# Patient Record
Sex: FEMALE | Race: WHITE | NOT HISPANIC OR LATINO | Employment: UNEMPLOYED | URBAN - METROPOLITAN AREA
[De-identification: names, ages, dates, MRNs, and addresses within clinical notes are randomized per-mention and may not be internally consistent; named-entity substitution may affect disease eponyms.]

---

## 2024-08-17 ENCOUNTER — APPOINTMENT (OUTPATIENT)
Dept: RADIOLOGY | Facility: HOSPITAL | Age: 29
End: 2024-08-17
Payer: COMMERCIAL

## 2024-08-17 ENCOUNTER — HOSPITAL ENCOUNTER (OUTPATIENT)
Dept: CARDIOLOGY | Facility: HOSPITAL | Age: 29
Discharge: HOME | End: 2024-08-17
Payer: COMMERCIAL

## 2024-08-17 ENCOUNTER — HOSPITAL ENCOUNTER (OUTPATIENT)
Facility: HOSPITAL | Age: 29
Setting detail: OBSERVATION
Discharge: HOME | End: 2024-08-18
Attending: EMERGENCY MEDICINE | Admitting: STUDENT IN AN ORGANIZED HEALTH CARE EDUCATION/TRAINING PROGRAM
Payer: COMMERCIAL

## 2024-08-17 DIAGNOSIS — R10.13 EPIGASTRIC PAIN: Primary | ICD-10-CM

## 2024-08-17 DIAGNOSIS — R11.2 INTRACTABLE NAUSEA AND VOMITING: ICD-10-CM

## 2024-08-17 LAB
ALBUMIN SERPL BCP-MCNC: 4.7 G/DL (ref 3.4–5)
ALP SERPL-CCNC: 47 U/L (ref 33–110)
ALT SERPL W P-5'-P-CCNC: 10 U/L (ref 7–45)
ANION GAP SERPL CALC-SCNC: 16 MMOL/L (ref 10–20)
APPEARANCE UR: CLEAR
AST SERPL W P-5'-P-CCNC: 11 U/L (ref 9–39)
B-HCG SERPL-ACNC: <2 MIU/ML
BASOPHILS # BLD AUTO: 0.02 X10*3/UL (ref 0–0.1)
BASOPHILS NFR BLD AUTO: 0.3 %
BILIRUB SERPL-MCNC: 0.5 MG/DL (ref 0–1.2)
BILIRUB UR STRIP.AUTO-MCNC: NEGATIVE MG/DL
BUN SERPL-MCNC: 11 MG/DL (ref 6–23)
CALCIUM SERPL-MCNC: 9.6 MG/DL (ref 8.6–10.3)
CHLORIDE SERPL-SCNC: 101 MMOL/L (ref 98–107)
CO2 SERPL-SCNC: 22 MMOL/L (ref 21–32)
COLOR UR: ABNORMAL
CREAT SERPL-MCNC: 0.75 MG/DL (ref 0.5–1.05)
EGFRCR SERPLBLD CKD-EPI 2021: >90 ML/MIN/1.73M*2
EOSINOPHIL # BLD AUTO: 0.08 X10*3/UL (ref 0–0.7)
EOSINOPHIL NFR BLD AUTO: 1.1 %
ERYTHROCYTE [DISTWIDTH] IN BLOOD BY AUTOMATED COUNT: 12.8 % (ref 11.5–14.5)
GLUCOSE SERPL-MCNC: 107 MG/DL (ref 74–99)
GLUCOSE UR STRIP.AUTO-MCNC: NORMAL MG/DL
HCT VFR BLD AUTO: 37.3 % (ref 36–46)
HGB BLD-MCNC: 12.7 G/DL (ref 12–16)
HOLD SPECIMEN: NORMAL
HOLD SPECIMEN: NORMAL
IMM GRANULOCYTES # BLD AUTO: 0.01 X10*3/UL (ref 0–0.7)
IMM GRANULOCYTES NFR BLD AUTO: 0.1 % (ref 0–0.9)
KETONES UR STRIP.AUTO-MCNC: ABNORMAL MG/DL
LACTATE SERPL-SCNC: 1.1 MMOL/L (ref 0.4–2)
LEUKOCYTE ESTERASE UR QL STRIP.AUTO: NEGATIVE
LIPASE SERPL-CCNC: 27 U/L (ref 9–82)
LYMPHOCYTES # BLD AUTO: 2.49 X10*3/UL (ref 1.2–4.8)
LYMPHOCYTES NFR BLD AUTO: 33.1 %
MCH RBC QN AUTO: 28.4 PG (ref 26–34)
MCHC RBC AUTO-ENTMCNC: 34 G/DL (ref 32–36)
MCV RBC AUTO: 83 FL (ref 80–100)
MONOCYTES # BLD AUTO: 0.37 X10*3/UL (ref 0.1–1)
MONOCYTES NFR BLD AUTO: 4.9 %
NEUTROPHILS # BLD AUTO: 4.56 X10*3/UL (ref 1.2–7.7)
NEUTROPHILS NFR BLD AUTO: 60.5 %
NITRITE UR QL STRIP.AUTO: NEGATIVE
NRBC BLD-RTO: 0 /100 WBCS (ref 0–0)
PH UR STRIP.AUTO: 7.5 [PH]
PLATELET # BLD AUTO: 265 X10*3/UL (ref 150–450)
POTASSIUM SERPL-SCNC: 3.5 MMOL/L (ref 3.5–5.3)
PROT SERPL-MCNC: 7.9 G/DL (ref 6.4–8.2)
PROT UR STRIP.AUTO-MCNC: NEGATIVE MG/DL
RBC # BLD AUTO: 4.47 X10*6/UL (ref 4–5.2)
RBC # UR STRIP.AUTO: NEGATIVE /UL
SODIUM SERPL-SCNC: 135 MMOL/L (ref 136–145)
SP GR UR STRIP.AUTO: 1.02
UROBILINOGEN UR STRIP.AUTO-MCNC: NORMAL MG/DL
WBC # BLD AUTO: 7.5 X10*3/UL (ref 4.4–11.3)

## 2024-08-17 PROCEDURE — 2500000005 HC RX 250 GENERAL PHARMACY W/O HCPCS

## 2024-08-17 PROCEDURE — 96361 HYDRATE IV INFUSION ADD-ON: CPT

## 2024-08-17 PROCEDURE — 81003 URINALYSIS AUTO W/O SCOPE: CPT | Performed by: EMERGENCY MEDICINE

## 2024-08-17 PROCEDURE — 96374 THER/PROPH/DIAG INJ IV PUSH: CPT | Mod: 59

## 2024-08-17 PROCEDURE — 80307 DRUG TEST PRSMV CHEM ANLYZR: CPT

## 2024-08-17 PROCEDURE — 2550000001 HC RX 255 CONTRASTS: Performed by: EMERGENCY MEDICINE

## 2024-08-17 PROCEDURE — 83690 ASSAY OF LIPASE: CPT

## 2024-08-17 PROCEDURE — 74177 CT ABD & PELVIS W/CONTRAST: CPT

## 2024-08-17 PROCEDURE — 36415 COLL VENOUS BLD VENIPUNCTURE: CPT | Performed by: EMERGENCY MEDICINE

## 2024-08-17 PROCEDURE — 99285 EMERGENCY DEPT VISIT HI MDM: CPT

## 2024-08-17 PROCEDURE — 96376 TX/PRO/DX INJ SAME DRUG ADON: CPT

## 2024-08-17 PROCEDURE — 93005 ELECTROCARDIOGRAM TRACING: CPT

## 2024-08-17 PROCEDURE — 96375 TX/PRO/DX INJ NEW DRUG ADDON: CPT

## 2024-08-17 PROCEDURE — 74177 CT ABD & PELVIS W/CONTRAST: CPT | Performed by: RADIOLOGY

## 2024-08-17 PROCEDURE — 2500000004 HC RX 250 GENERAL PHARMACY W/ HCPCS (ALT 636 FOR OP/ED)

## 2024-08-17 PROCEDURE — 83605 ASSAY OF LACTIC ACID: CPT

## 2024-08-17 PROCEDURE — 2500000004 HC RX 250 GENERAL PHARMACY W/ HCPCS (ALT 636 FOR OP/ED): Performed by: EMERGENCY MEDICINE

## 2024-08-17 PROCEDURE — 99285 EMERGENCY DEPT VISIT HI MDM: CPT | Performed by: EMERGENCY MEDICINE

## 2024-08-17 PROCEDURE — 85025 COMPLETE CBC W/AUTO DIFF WBC: CPT | Performed by: EMERGENCY MEDICINE

## 2024-08-17 PROCEDURE — 84702 CHORIONIC GONADOTROPIN TEST: CPT | Performed by: EMERGENCY MEDICINE

## 2024-08-17 PROCEDURE — 36415 COLL VENOUS BLD VENIPUNCTURE: CPT

## 2024-08-17 PROCEDURE — 80053 COMPREHEN METABOLIC PANEL: CPT | Performed by: EMERGENCY MEDICINE

## 2024-08-17 RX ORDER — KETOROLAC TROMETHAMINE 15 MG/ML
15 INJECTION, SOLUTION INTRAMUSCULAR; INTRAVENOUS ONCE
Status: COMPLETED | OUTPATIENT
Start: 2024-08-17 | End: 2024-08-17

## 2024-08-17 RX ORDER — FAMOTIDINE 10 MG/ML
20 INJECTION INTRAVENOUS EVERY 12 HOURS SCHEDULED
Status: DISCONTINUED | OUTPATIENT
Start: 2024-08-17 | End: 2024-08-18 | Stop reason: HOSPADM

## 2024-08-17 RX ORDER — MORPHINE SULFATE 4 MG/ML
4 INJECTION, SOLUTION INTRAMUSCULAR; INTRAVENOUS ONCE
Status: COMPLETED | OUTPATIENT
Start: 2024-08-17 | End: 2024-08-17

## 2024-08-17 RX ORDER — ONDANSETRON 4 MG/1
4 TABLET, FILM COATED ORAL EVERY 8 HOURS PRN
Status: DISCONTINUED | OUTPATIENT
Start: 2024-08-17 | End: 2024-08-17

## 2024-08-17 RX ORDER — ONDANSETRON 4 MG/1
4 TABLET, ORALLY DISINTEGRATING ORAL ONCE
Status: COMPLETED | OUTPATIENT
Start: 2024-08-17 | End: 2024-08-17

## 2024-08-17 RX ORDER — ONDANSETRON HYDROCHLORIDE 2 MG/ML
4 INJECTION, SOLUTION INTRAVENOUS EVERY 6 HOURS PRN
Status: DISCONTINUED | OUTPATIENT
Start: 2024-08-17 | End: 2024-08-18 | Stop reason: HOSPADM

## 2024-08-17 RX ORDER — ONDANSETRON HYDROCHLORIDE 2 MG/ML
4 INJECTION, SOLUTION INTRAVENOUS EVERY 8 HOURS PRN
Status: DISCONTINUED | OUTPATIENT
Start: 2024-08-17 | End: 2024-08-17

## 2024-08-17 RX ORDER — ONDANSETRON 4 MG/1
4 TABLET, FILM COATED ORAL EVERY 6 HOURS PRN
Status: DISCONTINUED | OUTPATIENT
Start: 2024-08-17 | End: 2024-08-18 | Stop reason: HOSPADM

## 2024-08-17 RX ADMIN — KETOROLAC TROMETHAMINE 15 MG: 15 INJECTION, SOLUTION INTRAMUSCULAR; INTRAVENOUS at 22:27

## 2024-08-17 RX ADMIN — IOHEXOL 75 ML: 350 INJECTION, SOLUTION INTRAVENOUS at 21:25

## 2024-08-17 RX ADMIN — MORPHINE SULFATE 4 MG: 4 INJECTION, SOLUTION INTRAMUSCULAR; INTRAVENOUS at 19:35

## 2024-08-17 RX ADMIN — SODIUM CHLORIDE, POTASSIUM CHLORIDE, SODIUM LACTATE AND CALCIUM CHLORIDE 1000 ML: 600; 310; 30; 20 INJECTION, SOLUTION INTRAVENOUS at 19:35

## 2024-08-17 RX ADMIN — ONDANSETRON 4 MG: 4 TABLET, ORALLY DISINTEGRATING ORAL at 19:35

## 2024-08-17 RX ADMIN — MORPHINE SULFATE 4 MG: 4 INJECTION, SOLUTION INTRAMUSCULAR; INTRAVENOUS at 23:32

## 2024-08-17 ASSESSMENT — COLUMBIA-SUICIDE SEVERITY RATING SCALE - C-SSRS
6. HAVE YOU EVER DONE ANYTHING, STARTED TO DO ANYTHING, OR PREPARED TO DO ANYTHING TO END YOUR LIFE?: NO
2. HAVE YOU ACTUALLY HAD ANY THOUGHTS OF KILLING YOURSELF?: NO
1. IN THE PAST MONTH, HAVE YOU WISHED YOU WERE DEAD OR WISHED YOU COULD GO TO SLEEP AND NOT WAKE UP?: NO

## 2024-08-17 ASSESSMENT — PAIN DESCRIPTION - LOCATION
LOCATION: ABDOMEN
LOCATION: ABDOMEN

## 2024-08-17 ASSESSMENT — PAIN SCALES - GENERAL
PAINLEVEL_OUTOF10: 2
PAINLEVEL_OUTOF10: 9
PAINLEVEL_OUTOF10: 8

## 2024-08-17 ASSESSMENT — PAIN - FUNCTIONAL ASSESSMENT
PAIN_FUNCTIONAL_ASSESSMENT: 0-10
PAIN_FUNCTIONAL_ASSESSMENT: 0-10

## 2024-08-17 ASSESSMENT — PAIN DESCRIPTION - PAIN TYPE: TYPE: ACUTE PAIN

## 2024-08-17 NOTE — ED PROVIDER NOTES
EMERGENCY DEPARTMENT ENCOUNTER      Pt Name: Bharti Echeverria  MRN: [id number]  Birthdate 1995  Date of evaluation: 8/17/2024    HISTORY OF PRESENT ILLNESS    Bharti Echeverria is an 29 y.o. female with history including pancreatitis and tubal ligation, presenting to the emergency department for epigastric abdominal pain that shoots to the back.  Patient endorses this began 2 days ago and has been acutely worsening.  She has had significant nausea and nonbloody vomiting where she is unable to keep anything down at this point.  This began with diarrhea but now she has not had a bowel movement in over a day however she has not been eating either.  Went to [facility] last night and received a workup including a basic labs, lipase and an ultrasound looking for stones and was subsequently discharged upon the unremarkable investigation.  She denies chest pain, shortness of breath however does feel like she is having fever and chills.  Patient curled up in bed in the dark stating she has been this sensitive to light as well as in severe pain at the moment.      PAST MEDICAL HISTORY   No past medical history on file.    SURGICAL HISTORY     No past surgical history on file.    CURRENT MEDICATIONS       There are no discharge medications for this patient.      ALLERGIES     Patient has no known allergies.    FAMILY HISTORY     No family history on file.     SOCIAL HISTORY       Social History     Socioeconomic History    Marital status:      Social Determinants of Health     Financial Resource Strain: Low Risk  (5/20/2024)    Received from [facility]    Overall Financial Resource Strain (CARDIA)     Difficulty of Paying Living Expenses: Not very hard   Food Insecurity: No Food Insecurity (5/20/2024)    Received from [facility]    Hunger Vital Sign     Worried About Running Out of Food in the Last Year: Never true     Ran Out of Food in the Last Year: Never true   Transportation Needs: No  Transportation Needs (5/20/2024)    Received from [facility]    PRAPARE - Transportation     Lack of Transportation (Medical): No     Lack of Transportation (Non-Medical): No   Physical Activity: Insufficiently Active (5/20/2024)    Received from [facility]    Exercise Vital Sign     Days of Exercise per Week: 2 days     Minutes of Exercise per Session: 60 min   Stress: Stress Concern Present (5/20/2024)    Received from [facility]    [employer] [place] [employer] - Occupational Stress Questionnaire     Feeling of Stress : Rather much   Social Connections: Moderately Integrated (5/20/2024)    Received from [facility]    Social Connection and Isolation Panel [NHANES]     Frequency of Communication with Friends and Family: More than three times a week     Frequency of Social Gatherings with Friends and Family: Once a week     Attends [employer] Services: 1 to 4 times per year     Active Member of Clubs or Organizations: No     Attends Club or Organization Meetings: Never     Marital Status:        PHYSICAL EXAM       ED Triage Vitals [08/17/24 1749]   Temperature Heart Rate Respirations BP   36.1 °C (97 °F) 89 20 137/88      Pulse Ox Temp Source Heart Rate Source Patient Position   99 % Temporal Monitor Sitting      BP Location FiO2 (%)     Right arm --       Physical Exam  Constitutional:       Appearance: She is normal weight.   HENT:      Head: Normocephalic and atraumatic.   Cardiovascular:      Rate and Rhythm: Normal rate and regular rhythm.      Heart sounds: Normal heart sounds.   Pulmonary:      Effort: Pulmonary effort is normal.      Breath sounds: Normal breath sounds.   Abdominal:      General: Abdomen is flat. Bowel sounds are decreased. There is no distension.      Palpations: Abdomen is soft.      Tenderness: There is generalized abdominal tenderness and tenderness in the epigastric area. There is right CVA tenderness, left CVA tenderness and guarding. There is  no rebound.   Skin:     General: Skin is warm and dry.      Capillary Refill: Capillary refill takes less than 2 seconds.   Neurological:      Mental Status: She is alert.          DIAGNOSTIC RESULTS     LABS:  Labs Reviewed   COMPREHENSIVE METABOLIC PANEL - Abnormal       Result Value    Glucose 107 (*)     Sodium 135 (*)     Potassium 3.5      Chloride 101      Bicarbonate 22      Anion Gap 16      Urea Nitrogen 11      Creatinine 0.75      eGFR >90      Calcium 9.6      Albumin 4.7      Alkaline Phosphatase 47      Total Protein 7.9      AST 11      Bilirubin, Total 0.5      ALT 10     URINALYSIS WITH REFLEX CULTURE AND MICROSCOPIC - Abnormal    Color, Urine Light-Yellow      Appearance, Urine Clear      Specific Gravity, Urine 1.016      pH, Urine 7.5      Protein, Urine NEGATIVE      Glucose, Urine Normal      Blood, Urine NEGATIVE      Ketones, Urine 40 (2+) (*)     Bilirubin, Urine NEGATIVE      Urobilinogen, Urine Normal      Nitrite, Urine NEGATIVE      Leukocyte Esterase, Urine NEGATIVE     DRUG SCREEN,URINE - Abnormal    Amphetamine Screen, Urine Presumptive Negative      Barbiturate Screen, Urine Presumptive Negative      Benzodiazepines Screen, Urine Presumptive Negative      Cannabinoid Screen, Urine Presumptive Negative      Cocaine Metabolite Screen, Urine Presumptive Negative      Fentanyl Screen, Urine Presumptive Negative      Opiate Screen, Urine Presumptive Positive (*)     Oxycodone Screen, Urine Presumptive Negative      PCP Screen, Urine Presumptive Negative      Methadone Screen, Urine Presumptive Negative      Narrative:     Drug screen results are presumptive and should not be used to assess   compliance with prescribed medication. Contact the performing [facility] laboratory   to add-on definitive confirmatory testing if clinically indicated.    Toxicology screening results are reported qualitatively. The concentration must   be greater than or equal to the cutoff to be reported as positive.  The concentration   at which the screening test can detect an individual drug or metabolite varies.   The absence of expected drug(s) and/or drug metabolite(s) may indicate non-compliance,   inappropriate timing of specimen collection relative to drug administration, poor drug   absorption, diluted/adulterated urine, or limitations of testing. For medical purposes   only; not valid for forensic use.    Interpretive questions should be directed to the laboratory medical directors.   LACTATE - Normal    Lactate 1.1      Narrative:     Venipuncture immediately after or during the administration of Metamizole may lead to falsely low results. Testing should be performed immediately  prior to Metamizole dosing.   LIPASE - Normal    Lipase 27      Narrative:     Venipuncture immediately after or during the administration of Metamizole may lead to falsely low results. Testing should be performed immediately prior to Metamizole dosing.   HUMAN CHORIONIC GONADOTROPIN, SERUM QUANTITATIVE - Normal    HCG, Beta-Quantitative <2      Narrative:      Total HCG measurement is performed using the [employer] Access   Immunoassay which detects intact HCG and free beta HCG subunit.    This test is not indicated for use as a tumor marker.   HCG testing is performed using a different test methodology at [facility] than other [facility]. Direct result comparison   should only be made within the same method.       CBC WITH AUTO DIFFERENTIAL    WBC 7.5      nRBC 0.0      RBC 4.47      Hemoglobin 12.7      Hematocrit 37.3      MCV 83      MCH 28.4      MCHC 34.0      RDW 12.8      Platelets 265      Neutrophils % 60.5      Immature Granulocytes %, Automated 0.1      Lymphocytes % 33.1      Monocytes % 4.9      Eosinophils % 1.1      Basophils % 0.3      Neutrophils Absolute 4.56      Immature Granulocytes Absolute, Automated 0.01      Lymphocytes Absolute 2.49      Monocytes Absolute 0.37      Eosinophils Absolute  0.08      Basophils Absolute 0.02     URINALYSIS WITH REFLEX CULTURE AND MICROSCOPIC    Narrative:     The following orders were created for panel order Urinalysis with Reflex Culture and Microscopic.  Procedure                               Abnormality         Status                     ---------                               -----------         ------                     Urinalysis with Reflex C...[[id number]]  Abnormal            Final result               Extra Urine Gray Tube[[id number]]                            In process                   Please view results for these tests on the individual orders.   EXTRA URINE GRAY TUBE   CBC   RENAL FUNCTION PANEL   MAGNESIUM   POCT PREGNANCY, URINE       All other labs were within normal range or not returned as of this dictation.    Imaging  CT abdomen pelvis w IV contrast   Final Result   1. No acute abnormality within the abdomen or pelvis.   2. Mild prominence of the intrahepatic ducts, nonspecific. Correlate   for clinical and laboratory evidence of cholestasis.   3. Mild sclerotic changes about the right sacroiliac joint prominent   and related to osteoarthritis. Correlate for evidence of inflammatory   arthropathy.        MACRO:   None        Signed by: Denny Flores 8/17/2024 10:08 PM   Dictation workstation:   [id number]           Procedures  Procedures     EMERGENCY DEPARTMENT COURSE/MDM:   Medical Decision Making  Patient is a 29-year-old female presenting with severe epigastric pain is radiating to the back.  She is visibly uncomfortable in bed.  Pain/nausea control given with Zofran, Toradol, and morphine.  Vital signs nonconcerning for sepsis at this time with no tachycardia, no hypotension, and afebrile as well as satting 99% on room air. Workup to include CBC, CMP, lipase, lactate, UA, pregnancy test, and CT abdomen and pelvis with IV contrast.  Workup revealed no leukocytosis, no anemia, lipase within normal limits, lactate within normal limits, UA  unconcerning other than ketonuria.  CT abdomen pelvis showed no acute abnormality within the abdomen or pelvis.  Discussion with patient about findings, discussion had about a p.o. challenge and discharge however patient states she does not feel she will be able to eat or drink without return of the pain and significant nausea and vomiting.  Upon returning to the room to check on patient she was dry heaving into a bag and endorsing significant nausea and vomiting after p.o. challenge.  Will plan to admit for Atropt intractable nausea and vomiting after failing p.o. challenge.  Diagnoses as of 08/18/24 0107   Epigastric pain   Intractable nausea and vomiting        External records reviewed: recent inpatient, clinic, and prior ED notes  Labs and Diagnostic imaging independently reviewed/interpreted by me.    Patient plan, care, lab results and imaging were all discussed with attending.    ED Medications administered this visit:    Medications   polyethylene glycol (Glycolax, Miralax) packet 17 g (has no administration in time range)   famotidine PF (Pepcid) injection 20 mg (20 mg intravenous Given 8/18/24 0046)   ondansetron (Zofran) tablet 4 mg (has no administration in time range)     Or   ondansetron (Zofran) injection 4 mg (has no administration in time range)   ondansetron ODT (Zofran-ODT) disintegrating tablet 4 mg (4 mg oral Given 8/17/24 1935)   ketorolac (Toradol) injection 15 mg (15 mg intravenous Given 8/17/24 2227)   morphine injection 4 mg (4 mg intravenous Given 8/17/24 1935)   lactated Ringer's bolus 1,000 mL (0 mL intravenous Stopped 8/17/24 2322)   iohexol (OMNIPaque) 350 mg iodine/mL solution 75 mL (75 mL intravenous Given 8/17/24 2125)   morphine injection 4 mg (4 mg intravenous Given 8/17/24 2332)     New Prescriptions from this visit:    There are no discharge medications for this patient.      (Please note that portions of this note were completed with a voice recognition program.  Efforts were  made to edit the dictations but occasionally words are mis-transcribed.)     Raul Rivera MD  Resident  08/18/24 0107

## 2024-08-18 VITALS
TEMPERATURE: 98.6 F | DIASTOLIC BLOOD PRESSURE: 77 MMHG | HEIGHT: 65 IN | BODY MASS INDEX: 21.66 KG/M2 | SYSTOLIC BLOOD PRESSURE: 111 MMHG | RESPIRATION RATE: 16 BRPM | WEIGHT: 130 LBS | HEART RATE: 64 BPM | OXYGEN SATURATION: 98 %

## 2024-08-18 PROBLEM — R11.2 NAUSEA & VOMITING: Status: RESOLVED | Noted: 2024-08-17 | Resolved: 2024-08-18

## 2024-08-18 LAB
ALBUMIN SERPL BCP-MCNC: 3.9 G/DL (ref 3.4–5)
AMPHETAMINES UR QL SCN: ABNORMAL
ANION GAP SERPL CALC-SCNC: 11 MMOL/L (ref 10–20)
BARBITURATES UR QL SCN: ABNORMAL
BENZODIAZ UR QL SCN: ABNORMAL
BUN SERPL-MCNC: 8 MG/DL (ref 6–23)
BZE UR QL SCN: ABNORMAL
CALCIUM SERPL-MCNC: 8.6 MG/DL (ref 8.6–10.3)
CANNABINOIDS UR QL SCN: ABNORMAL
CHLORIDE SERPL-SCNC: 104 MMOL/L (ref 98–107)
CO2 SERPL-SCNC: 26 MMOL/L (ref 21–32)
CREAT SERPL-MCNC: 0.64 MG/DL (ref 0.5–1.05)
EGFRCR SERPLBLD CKD-EPI 2021: >90 ML/MIN/1.73M*2
ERYTHROCYTE [DISTWIDTH] IN BLOOD BY AUTOMATED COUNT: 12.7 % (ref 11.5–14.5)
FENTANYL+NORFENTANYL UR QL SCN: ABNORMAL
GLUCOSE SERPL-MCNC: 95 MG/DL (ref 74–99)
HCT VFR BLD AUTO: 33.4 % (ref 36–46)
HGB BLD-MCNC: 11.2 G/DL (ref 12–16)
HOLD SPECIMEN: NORMAL
MAGNESIUM SERPL-MCNC: 1.93 MG/DL (ref 1.6–2.4)
MCH RBC QN AUTO: 28.5 PG (ref 26–34)
MCHC RBC AUTO-ENTMCNC: 33.5 G/DL (ref 32–36)
MCV RBC AUTO: 85 FL (ref 80–100)
METHADONE UR QL SCN: ABNORMAL
NRBC BLD-RTO: 0 /100 WBCS (ref 0–0)
OPIATES UR QL SCN: ABNORMAL
OXYCODONE+OXYMORPHONE UR QL SCN: ABNORMAL
PCP UR QL SCN: ABNORMAL
PHOSPHATE SERPL-MCNC: 3.1 MG/DL (ref 2.5–4.9)
PLATELET # BLD AUTO: 218 X10*3/UL (ref 150–450)
POTASSIUM SERPL-SCNC: 3.5 MMOL/L (ref 3.5–5.3)
RBC # BLD AUTO: 3.93 X10*6/UL (ref 4–5.2)
SODIUM SERPL-SCNC: 137 MMOL/L (ref 136–145)
WBC # BLD AUTO: 5.6 X10*3/UL (ref 4.4–11.3)

## 2024-08-18 PROCEDURE — G0378 HOSPITAL OBSERVATION PER HR: HCPCS

## 2024-08-18 PROCEDURE — 99234 HOSP IP/OBS SM DT SF/LOW 45: CPT

## 2024-08-18 PROCEDURE — 85027 COMPLETE CBC AUTOMATED: CPT

## 2024-08-18 PROCEDURE — 36415 COLL VENOUS BLD VENIPUNCTURE: CPT

## 2024-08-18 PROCEDURE — 96375 TX/PRO/DX INJ NEW DRUG ADDON: CPT

## 2024-08-18 PROCEDURE — 2500000004 HC RX 250 GENERAL PHARMACY W/ HCPCS (ALT 636 FOR OP/ED)

## 2024-08-18 PROCEDURE — 83735 ASSAY OF MAGNESIUM: CPT

## 2024-08-18 PROCEDURE — 80069 RENAL FUNCTION PANEL: CPT

## 2024-08-18 PROCEDURE — 96376 TX/PRO/DX INJ SAME DRUG ADON: CPT

## 2024-08-18 RX ORDER — ACETAMINOPHEN 650 MG/1
650 SUPPOSITORY RECTAL EVERY 4 HOURS PRN
Status: DISCONTINUED | OUTPATIENT
Start: 2024-08-18 | End: 2024-08-18 | Stop reason: HOSPADM

## 2024-08-18 RX ORDER — PANTOPRAZOLE SODIUM 40 MG/10ML
40 INJECTION, POWDER, LYOPHILIZED, FOR SOLUTION INTRAVENOUS ONCE
Status: COMPLETED | OUTPATIENT
Start: 2024-08-18 | End: 2024-08-18

## 2024-08-18 RX ORDER — FAMOTIDINE 20 MG/1
20 TABLET, FILM COATED ORAL 2 TIMES DAILY PRN
Qty: 30 TABLET | Refills: 0 | Status: SHIPPED | OUTPATIENT
Start: 2024-08-18

## 2024-08-18 RX ORDER — DICYCLOMINE HYDROCHLORIDE 20 MG/1
20 TABLET ORAL 4 TIMES DAILY PRN
COMMUNITY

## 2024-08-18 RX ORDER — POLYETHYLENE GLYCOL 3350 17 G/17G
17 POWDER, FOR SOLUTION ORAL DAILY
Status: DISCONTINUED | OUTPATIENT
Start: 2024-08-18 | End: 2024-08-18 | Stop reason: HOSPADM

## 2024-08-18 RX ORDER — KETOROLAC TROMETHAMINE 15 MG/ML
15 INJECTION, SOLUTION INTRAMUSCULAR; INTRAVENOUS ONCE
Status: COMPLETED | OUTPATIENT
Start: 2024-08-18 | End: 2024-08-18

## 2024-08-18 RX ORDER — ONDANSETRON 4 MG/1
4 TABLET, ORALLY DISINTEGRATING ORAL EVERY 8 HOURS PRN
COMMUNITY

## 2024-08-18 RX ORDER — ACETAMINOPHEN 325 MG/1
650 TABLET ORAL EVERY 4 HOURS PRN
Status: DISCONTINUED | OUTPATIENT
Start: 2024-08-18 | End: 2024-08-18 | Stop reason: HOSPADM

## 2024-08-18 RX ORDER — OMEPRAZOLE 20 MG/1
20 CAPSULE, DELAYED RELEASE ORAL DAILY PRN
COMMUNITY
End: 2024-08-18 | Stop reason: HOSPADM

## 2024-08-18 RX ORDER — ACETAMINOPHEN 160 MG/5ML
650 SOLUTION ORAL EVERY 4 HOURS PRN
Status: DISCONTINUED | OUTPATIENT
Start: 2024-08-18 | End: 2024-08-18 | Stop reason: HOSPADM

## 2024-08-18 RX ORDER — PANTOPRAZOLE SODIUM 40 MG/1
40 TABLET, DELAYED RELEASE ORAL
Qty: 30 TABLET | Refills: 0 | Status: SHIPPED | OUTPATIENT
Start: 2024-08-18 | End: 2024-09-17

## 2024-08-18 RX ORDER — FAMOTIDINE 10 MG/ML
40 INJECTION INTRAVENOUS ONCE
Status: DISCONTINUED | OUTPATIENT
Start: 2024-08-18 | End: 2024-08-18

## 2024-08-18 RX ADMIN — PANTOPRAZOLE SODIUM 40 MG: 40 INJECTION, POWDER, FOR SOLUTION INTRAVENOUS at 08:55

## 2024-08-18 RX ADMIN — FAMOTIDINE 20 MG: 10 INJECTION, SOLUTION INTRAVENOUS at 11:42

## 2024-08-18 RX ADMIN — ACETAMINOPHEN 650 MG: 325 TABLET ORAL at 13:23

## 2024-08-18 RX ADMIN — KETOROLAC TROMETHAMINE 15 MG: 15 INJECTION, SOLUTION INTRAMUSCULAR; INTRAVENOUS at 06:13

## 2024-08-18 RX ADMIN — FAMOTIDINE 20 MG: 10 INJECTION, SOLUTION INTRAVENOUS at 00:46

## 2024-08-18 SDOH — ECONOMIC STABILITY: HOUSING INSECURITY: AT ANY TIME IN THE PAST 12 MONTHS, WERE YOU HOMELESS OR LIVING IN A SHELTER (INCLUDING NOW)?: NO

## 2024-08-18 SDOH — SOCIAL STABILITY: SOCIAL INSECURITY: WERE YOU ABLE TO COMPLETE ALL THE BEHAVIORAL HEALTH SCREENINGS?: YES

## 2024-08-18 SDOH — SOCIAL STABILITY: SOCIAL INSECURITY: DOES ANYONE TRY TO KEEP YOU FROM HAVING/CONTACTING OTHER FRIENDS OR DOING THINGS OUTSIDE YOUR HOME?: NO

## 2024-08-18 SDOH — SOCIAL STABILITY: SOCIAL INSECURITY: DO YOU FEEL ANYONE HAS EXPLOITED OR TAKEN ADVANTAGE OF YOU FINANCIALLY OR OF YOUR PERSONAL PROPERTY?: NO

## 2024-08-18 SDOH — SOCIAL STABILITY: SOCIAL INSECURITY: ABUSE: ADULT

## 2024-08-18 SDOH — SOCIAL STABILITY: SOCIAL INSECURITY: ARE THERE ANY APPARENT SIGNS OF INJURIES/BEHAVIORS THAT COULD BE RELATED TO ABUSE/NEGLECT?: NO

## 2024-08-18 SDOH — ECONOMIC STABILITY: INCOME INSECURITY: IN THE LAST 12 MONTHS, WAS THERE A TIME WHEN YOU WERE NOT ABLE TO PAY THE MORTGAGE OR RENT ON TIME?: NO

## 2024-08-18 SDOH — ECONOMIC STABILITY: INCOME INSECURITY: HOW HARD IS IT FOR YOU TO PAY FOR THE VERY BASICS LIKE FOOD, HOUSING, MEDICAL CARE, AND HEATING?: NOT HARD AT ALL

## 2024-08-18 SDOH — ECONOMIC STABILITY: TRANSPORTATION INSECURITY
IN THE PAST 12 MONTHS, HAS LACK OF TRANSPORTATION KEPT YOU FROM MEETINGS, WORK, OR FROM GETTING THINGS NEEDED FOR DAILY LIVING?: NO

## 2024-08-18 SDOH — SOCIAL STABILITY: SOCIAL INSECURITY: DO YOU FEEL UNSAFE GOING BACK TO THE PLACE WHERE YOU ARE LIVING?: NO

## 2024-08-18 SDOH — SOCIAL STABILITY: SOCIAL INSECURITY: HAS ANYONE EVER THREATENED TO HURT YOUR FAMILY OR YOUR PETS?: NO

## 2024-08-18 SDOH — ECONOMIC STABILITY: HOUSING INSECURITY: IN THE PAST 12 MONTHS, HOW MANY TIMES HAVE YOU MOVED WHERE YOU WERE LIVING?: 0

## 2024-08-18 SDOH — ECONOMIC STABILITY: TRANSPORTATION INSECURITY
IN THE PAST 12 MONTHS, HAS THE LACK OF TRANSPORTATION KEPT YOU FROM MEDICAL APPOINTMENTS OR FROM GETTING MEDICATIONS?: NO

## 2024-08-18 SDOH — SOCIAL STABILITY: SOCIAL INSECURITY: HAVE YOU HAD THOUGHTS OF HARMING ANYONE ELSE?: NO

## 2024-08-18 SDOH — SOCIAL STABILITY: SOCIAL INSECURITY: ARE YOU OR HAVE YOU BEEN THREATENED OR ABUSED PHYSICALLY, EMOTIONALLY, OR SEXUALLY BY ANYONE?: NO

## 2024-08-18 ASSESSMENT — ENCOUNTER SYMPTOMS
FEVER: 0
LIGHT-HEADEDNESS: 0
FATIGUE: 0
VOMITING: 1
DYSURIA: 0
CHILLS: 1
COUGH: 0
DIARRHEA: 1
APPETITE CHANGE: 1
DIFFICULTY URINATING: 0
PALPITATIONS: 0
ABDOMINAL PAIN: 1
HEADACHES: 0
CHEST TIGHTNESS: 0
BLOOD IN STOOL: 0
DIZZINESS: 0
WEAKNESS: 0
CONSTIPATION: 1
NAUSEA: 1

## 2024-08-18 ASSESSMENT — LIFESTYLE VARIABLES
HOW OFTEN DO YOU HAVE A DRINK CONTAINING ALCOHOL: NEVER
AUDIT-C TOTAL SCORE: 0
SKIP TO QUESTIONS 9-10: 1
HOW MANY STANDARD DRINKS CONTAINING ALCOHOL DO YOU HAVE ON A TYPICAL DAY: PATIENT DOES NOT DRINK
AUDIT-C TOTAL SCORE: 0
HOW OFTEN DO YOU HAVE 6 OR MORE DRINKS ON ONE OCCASION: NEVER

## 2024-08-18 ASSESSMENT — COGNITIVE AND FUNCTIONAL STATUS - GENERAL
DAILY ACTIVITIY SCORE: 24
PATIENT BASELINE BEDBOUND: NO
MOBILITY SCORE: 24

## 2024-08-18 ASSESSMENT — ACTIVITIES OF DAILY LIVING (ADL)
HEARING - LEFT EAR: FUNCTIONAL
HEARING - RIGHT EAR: FUNCTIONAL
GROOMING: INDEPENDENT
JUDGMENT_ADEQUATE_SAFELY_COMPLETE_DAILY_ACTIVITIES: YES
PATIENT'S MEMORY ADEQUATE TO SAFELY COMPLETE DAILY ACTIVITIES?: YES
ADEQUATE_TO_COMPLETE_ADL: YES
FEEDING YOURSELF: INDEPENDENT
WALKS IN HOME: INDEPENDENT
ASSISTIVE_DEVICE: EYEGLASSES
BATHING: INDEPENDENT
DRESSING YOURSELF: INDEPENDENT
TOILETING: INDEPENDENT

## 2024-08-18 ASSESSMENT — PATIENT HEALTH QUESTIONNAIRE - PHQ9
2. FEELING DOWN, DEPRESSED OR HOPELESS: NOT AT ALL
SUM OF ALL RESPONSES TO PHQ9 QUESTIONS 1 & 2: 0
1. LITTLE INTEREST OR PLEASURE IN DOING THINGS: NOT AT ALL

## 2024-08-18 ASSESSMENT — PAIN SCALES - GENERAL
PAINLEVEL_OUTOF10: 3
PAINLEVEL_OUTOF10: 6
PAINLEVEL_OUTOF10: 3
PAINLEVEL_OUTOF10: 0 - NO PAIN
PAINLEVEL_OUTOF10: 0 - NO PAIN
PAINLEVEL_OUTOF10: 7

## 2024-08-18 ASSESSMENT — PAIN DESCRIPTION - ORIENTATION: ORIENTATION: MID

## 2024-08-18 ASSESSMENT — PAIN DESCRIPTION - LOCATION: LOCATION: BACK

## 2024-08-18 ASSESSMENT — PAIN - FUNCTIONAL ASSESSMENT: PAIN_FUNCTIONAL_ASSESSMENT: 0-10

## 2024-08-18 NOTE — HOSPITAL COURSE
Patient is a 29-year-old female who presented to [facility] ED on 8/17 for intractable nausea, vomiting, and epigastric pain.  Patient does have a history of pancreatitis.  She was admitted to the hospital for intractable nausea and vomiting and unable to tolerate p.o. intake.    In the emergency department patient was hemodynamically stable and labs were unremarkable with a lactate of 1.1 and lipase of 27.  A CT abdomen pelvis was done and showed no acute abnormalities except for a mild sclerotic change in the right SI joint.  Patient was given Zofran fluids, morphine and Toradol for pain control.    Patient nausea and vomiting had subsided, however patient was fatigued from her rough night.  She was able to tolerate p.o. intake and she was discharged with prescriptions of Protonix for 30 days and Pepcid to be taken as needed for breakthrough epigastric pain.  She does have Zofran at home for nausea.  She is to follow-up with her primary care physician.

## 2024-08-18 NOTE — DISCHARGE SUMMARY
Discharge Diagnosis  Nausea & vomiting    Issues Requiring Follow-Up  PCP    Discharge Meds     Your medication list        START taking these medications        Instructions Last Dose Given Next Dose Due   famotidine 20 mg tablet  Commonly known as: Pepcid      Take 1 tablet (20 mg) by mouth 2 times a day as needed for heartburn or indigestion.       pantoprazole 40 mg EC tablet  Commonly known as: ProtoNix      Take 1 tablet (40 mg) by mouth once daily in the morning. Take before meals. Do not crush, chew, or split.              CONTINUE taking these medications        Instructions Last Dose Given Next Dose Due   dicyclomine 20 mg tablet  Commonly known as: Bentyl           ondansetron ODT 4 mg disintegrating tablet  Commonly known as: Zofran-ODT                  STOP taking these medications      omeprazole 20 mg DR capsule  Commonly known as: PriLOSEC                  Where to Get Your Medications        These medications were sent to [id number] - [place], [place] - [address] AT [facility]  [address], [place] [place] [facility] [address]      Phone: [phone]   famotidine 20 mg tablet  pantoprazole 40 mg EC tablet         Test Results Pending At Discharge  Pending Labs       No current pending labs.            Hospital Course  Patient is a 29-year-old female who presented to [facility] ED on 8/17 for intractable nausea, vomiting, and epigastric pain.  Patient does have a history of pancreatitis.  She was admitted to the hospital for intractable nausea and vomiting and unable to tolerate p.o. intake.    In the emergency department patient was hemodynamically stable and labs were unremarkable with a lactate of 1.1 and lipase of 27.  A CT abdomen pelvis was done and showed no acute abnormalities except for a mild sclerotic change in the right SI joint.  Patient was given Zofran fluids, morphine and Toradol for pain control.    Patient nausea and vomiting had subsided, however patient was fatigued from her rough  night.  She was able to tolerate p.o. intake and she was discharged with prescriptions of Protonix for 30 days and Pepcid to be taken as needed for breakthrough epigastric pain.  She does have Zofran at home for nausea.  She is to follow-up with her primary care physician.    Pertinent Physical Exam At Time of Discharge  Physical Exam  Constitutional: A&Ox4, NAD, resting comfortable   Cardiovascular: RRR, S1/S2, no murmurs, rubs, or gallops, radial pulses +2  Pulmonary: CTAB, no respiratory distress, no wheezing, rales or rhonchi, on RA  Abdomen: +BS, soft, mild epigastric tenderness, nondistended, no guarding rigidity or rebound tenderness, no masses noted  MSK: Negative for edema, No joint swelling, normal movements of all extremities.   Neuro: No focal deficits, normal motor function, normal sensation, follows all commands    Outpatient Follow-Up  No future appointments.      Gurmeet Ryaner, DO

## 2024-08-18 NOTE — CARE PLAN
The patient's goals for the shift include      The clinical goals for the shift include stable vital signs, pain management    Over the shift, the patient did make progress toward the following goals of stable vital signs and pain management.

## 2024-08-18 NOTE — H&P
Internal Medicine    Admission H&P      Patient Bharti Echeverria PCP No Assigned PCP Generic Provider, MD    MRTANG [id number]  Admission Date 8/17/2024      Chief Complaint/Reason for Admission:  Bharti is a 29 y.o. female who presented today with epigastric abdominal pain.     Subjective    Subjective   History of Presenting Illness  Ms. Bharti Echeverria is a 29 y.o. female with a primary complaint of epigastric abdominal pain, nausea and vomiting.   PMHx includes pancreatitis, tubal ligation.   The patient reports that her pain has been severe for the past two days. She also complains of significant nausea and vomiting since the pain began. She has not been able to keep down food well. She came in today because she was unable to keep down clear fluids and plain jessica crackers earlier today. Her pain is primarily located over her epigastric region and into the left upper quadrant. She denies any recent medication changes. She did have a few episodes of diarrhea earlier this week, as did two of her kids. Since the diarrhea, she now has constipation. Otherwise no recent illnesses. She denies any systemic symptoms aside from chills.     Of note, patient did have a history of pancreatitis about 6 years ago. She dealt with intermittent severe epigastric pain for about 9 months at that time. Her symptoms resolved when she had her IUD removed. She has since had a tubal ligation. She never had another bout of pancreatitis since. She reports that her current pain feels similar to her prior experience with pancreatitis.     ED course:  V/S: On initial presentation, T 97, HR 89, RR 20, /88, SpO2 99% on room air  Labs: CMP showed sodium 135, otherwise within normal limits. CBC was unremarkable. Glucose 107. Lactate 1.1. Lipase 27. UA was negative aside from 2+ ketones. UDS negative aside from opiates (had received morphine)  Imaging: CT abdomen and pelvis showed no acute abnormality. Mild prominence of the intrahepatic  "ducts (nonspecific) and mild sclerotic changes about the right SI joint.   Intervention: Patient presented to [facility] ED hemodynamically stable, complaining of significant epigastric abdominal pain, nausea/vomiting and lack of oral intake for the past two days. Labs largely unremarkable, imaging negative for pancreatitis or other acute findings. Given LR, zofran, morphine 4mg x2 and toradol for pain control.   Patient did agree to try p.o. challenge in the ED, but she did vomit shortly after eating.   Thus, decision made for admission for inability to tolerate p.o. intake.     Past Medical History  Active Ambulatory Problems     Diagnosis Date Noted    No Active Ambulatory Problems     Resolved Ambulatory Problems     Diagnosis Date Noted    No Resolved Ambulatory Problems     No Additional Past Medical History       Past Surgical History     Social History    Home Medication:  Prior to Admission medications    Not on File        Family History  Noncontributory to current complaint.    Allergies:  No Known Allergies     Review of System:  Review of Systems   Constitutional:  Positive for appetite change and chills. Negative for fatigue and fever.   Eyes:  Negative for visual disturbance.   Respiratory:  Negative for cough and chest tightness.    Cardiovascular:  Negative for chest pain, palpitations and leg swelling.   Gastrointestinal:  Positive for abdominal pain, constipation, diarrhea, nausea and vomiting. Negative for blood in stool.   Genitourinary:  Negative for difficulty urinating and dysuria.   Neurological:  Negative for dizziness, syncope, weakness, light-headedness and headaches.       Objective    Objective   Vital Signs:  Visit Vitals  /80 (BP Location: Right arm, Patient Position: Lying)   Pulse 60   Temp 36.1 °C (97 °F) (Temporal)   Resp 18   Ht 1.651 m (5' 5\")   Wt 59 kg (130 lb)   SpO2 100%   BMI 21.63 kg/m²   BSA 1.64 m²        Physical Examination:    General: No in acute distress, A&O x3, " alert, coopertive, well-developed  HEENT: Normocephalic, atraumatic, EOMI, moist mucous membranes  Neck: Neck supple, trachea midline, no evidence of trauma  Cardiovascular: RRR, S1 and S2 appreciated, no murmurs rubs gallops appreciated  Respiratory: Vesicular breath sounds appreciated bilaterally, no adventitious sounds appreciated, no increased work of breathing  GI: Abdomen soft, nondistended, tender to palpation in the left upper quadrant and epigastric region, otherwise nontender to palpation  Extremities: No edema appreciated in lower extremities bilaterally, no cyanosis  Neuro: A&O x3, no focal deficits, strength and sensation intact bilaterally  Skin: Warm and dry, without lesions or rashes  Psychiatric: Judgment intact.  Appropriate mood, affect and behavior.      Laboratory Data:  Results for orders placed or performed during the hospital encounter of 08/17/24 (from the past 24 hour(s))   CBC with Differential   Result Value Ref Range    WBC 7.5 4.4 - 11.3 x10*3/uL    nRBC 0.0 0.0 - 0.0 /100 WBCs    RBC 4.47 4.00 - 5.20 x10*6/uL    Hemoglobin 12.7 12.0 - 16.0 g/dL    Hematocrit 37.3 36.0 - 46.0 %    MCV 83 80 - 100 fL    MCH 28.4 26.0 - 34.0 pg    MCHC 34.0 32.0 - 36.0 g/dL    RDW 12.8 11.5 - 14.5 %    Platelets 265 150 - 450 x10*3/uL    Neutrophils % 60.5 40.0 - 80.0 %    Immature Granulocytes %, Automated 0.1 0.0 - 0.9 %    Lymphocytes % 33.1 13.0 - 44.0 %    Monocytes % 4.9 2.0 - 10.0 %    Eosinophils % 1.1 0.0 - 6.0 %    Basophils % 0.3 0.0 - 2.0 %    Neutrophils Absolute 4.56 1.20 - 7.70 x10*3/uL    Immature Granulocytes Absolute, Automated 0.01 0.00 - 0.70 x10*3/uL    Lymphocytes Absolute 2.49 1.20 - 4.80 x10*3/uL    Monocytes Absolute 0.37 0.10 - 1.00 x10*3/uL    Eosinophils Absolute 0.08 0.00 - 0.70 x10*3/uL    Basophils Absolute 0.02 0.00 - 0.10 x10*3/uL   Comprehensive Metabolic Panel   Result Value Ref Range    Glucose 107 (H) 74 - 99 mg/dL    Sodium 135 (L) 136 - 145 mmol/L    Potassium 3.5  3.5 - 5.3 mmol/L    Chloride 101 98 - 107 mmol/L    Bicarbonate 22 21 - 32 mmol/L    Anion Gap 16 10 - 20 mmol/L    Urea Nitrogen 11 6 - 23 mg/dL    Creatinine 0.75 0.50 - 1.05 mg/dL    eGFR >90 >60 mL/min/1.73m*2    Calcium 9.6 8.6 - 10.3 mg/dL    Albumin 4.7 3.4 - 5.0 g/dL    Alkaline Phosphatase 47 33 - 110 U/L    Total Protein 7.9 6.4 - 8.2 g/dL    AST 11 9 - 39 U/L    Bilirubin, Total 0.5 0.0 - 1.2 mg/dL    ALT 10 7 - 45 U/L   Lipase   Result Value Ref Range    Lipase 27 9 - 82 U/L   hCG, quantitative, pregnancy   Result Value Ref Range    HCG, Beta-Quantitative <2 <5 mIU/mL   PST Top   Result Value Ref Range    Extra Tube Hold for add-ons.    Light Blue Top   Result Value Ref Range    Extra Tube Hold for add-ons.    Lactate   Result Value Ref Range    Lactate 1.1 0.4 - 2.0 mmol/L   Urinalysis with Reflex Culture and Microscopic   Result Value Ref Range    Color, Urine Light-Yellow Light-Yellow, Yellow, Dark-Yellow    Appearance, Urine Clear Clear    Specific Gravity, Urine 1.016 1.005 - 1.035    pH, Urine 7.5 5.0, 5.5, 6.0, 6.5, 7.0, 7.5, 8.0    Protein, Urine NEGATIVE NEGATIVE, 10 (TRACE), 20 (TRACE) mg/dL    Glucose, Urine Normal Normal mg/dL    Blood, Urine NEGATIVE NEGATIVE    Ketones, Urine 40 (2+) (A) NEGATIVE mg/dL    Bilirubin, Urine NEGATIVE NEGATIVE    Urobilinogen, Urine Normal Normal mg/dL    Nitrite, Urine NEGATIVE NEGATIVE    Leukocyte Esterase, Urine NEGATIVE NEGATIVE   Drug Screen, Urine   Result Value Ref Range    Amphetamine Screen, Urine Presumptive Negative Presumptive Negative    Barbiturate Screen, Urine Presumptive Negative Presumptive Negative    Benzodiazepines Screen, Urine Presumptive Negative Presumptive Negative    Cannabinoid Screen, Urine Presumptive Negative Presumptive Negative    Cocaine Metabolite Screen, Urine Presumptive Negative Presumptive Negative    Fentanyl Screen, Urine Presumptive Negative Presumptive Negative    Opiate Screen, Urine Presumptive Positive (A)  Presumptive Negative    Oxycodone Screen, Urine Presumptive Negative Presumptive Negative    PCP Screen, Urine Presumptive Negative Presumptive Negative    Methadone Screen, Urine Presumptive Negative Presumptive Negative       Imaging:  CT abdomen pelvis w IV contrast    Result Date: 8/17/2024  Interpreted By:  Denny Flores, STUDY: CT ABDOMEN PELVIS W IV CONTRAST;  8/17/2024 9:33 pm   INDICATION: Signs/Symptoms:epigastric abdominal pain.   COMPARISON: CT abdomen 08/20/2011   ACCESSION NUMBER(S): [id number]   ORDERING CLINICIAN: YVONNE WALSH   TECHNIQUE: Contiguous axial images of the abdomen and pelvis were obtained after the intravenous administration of 75 mL Omnipaque 350 contrast. Coronal and sagittal reformatted images were reconstructed from the axial data.   FINDINGS: LOWER CHEST: No acute abnormality.   LIVER: No significant parenchymal abnormality.   BILE DUCTS: Mild intrahepatic biliary ductal dilatation. The common bile duct is normal in diameter. No radiopaque stone identified.   GALLBLADDER: No significant abnormality.   PANCREAS: No significant abnormality. No appreciable peripancreatic inflammation.   SPLEEN: Multiple circumscribed subcentimeter hypodensities are noted along the periphery of the spleen, incompletely characterized on this exam. MRI could be performed for further assessment as clinically warranted.   ADRENALS: No significant abnormality.   KIDNEYS, URETERS, BLADDER: No hydronephrosis or hydroureter. No appreciable renal or ureteral calculus. Please note the mid to distal ureters are not well visualized due to adjacent structures. The bladder is within normal limits.   REPRODUCTIVE ORGANS: No significant abnormality. The uterus is retroverted.   GI: No obstruction. No appreciable abnormal bowel wall thickening or inflammation. Normal appendix.   VESSELS: No significant abnormality. The portal veins and IVC are patent.   PERITONEUM/RETROPERITONEUM: No ascites, free air, or fluid  collection.   LYMPH NODES: No enlarged lymph nodes.   ABDOMINAL WALL: No significant abnormality.   OSSEOUS STRUCTURES: No acute osseous abnormality. Mild sclerosis about the right sacroiliac joint which could reflect changes of osteoarthritis or inflammatory arthropathy.       1. No acute abnormality within the abdomen or pelvis. 2. Mild prominence of the intrahepatic ducts, nonspecific. Correlate for clinical and laboratory evidence of cholestasis. 3. Mild sclerotic changes about the right sacroiliac joint prominent and related to osteoarthritis. Correlate for evidence of inflammatory arthropathy.   MACRO: None   Signed by: Denny Flores 8/17/2024 10:08 PM Dictation workstation:   [id number]    US gallbladder    Result Date: 8/16/2024  * * *Final Report* * * DATE OF EXAM: Aug 16 2024 11:20AM   [facility]   1032  -  US ABD RIGHT UPPER QUADRANT  / ACCESSION #  [id number] PROCEDURE REASON: RUQ pain, no fever, no elev WBC      * * * * Physician Interpretation * * * *  EXAMINATION:   RIGHT UPPER QUADRANT ULTRASOUND CLINICAL HISTORY: Right abdominal pain TECHNIQUE:  Sonography of the right upper quadrant  was performed.   Images were obtained and stored in a permanent archive. MQ:  [id number] COMPARISON: Contrast abdomen CT dated 07/23/2018 RESULT: Pancreas: Normal sonographic appearance.    Portions obscured: tail Liver:      Echotexture:  Normal, homogeneous.      Echogenicity:  Normal      Surface contour:  Smooth      Lesions:  None. Biliary: No intrahepatic biliary duct dilation.      CBD: 0.4 cm at the hilum.      Gallbladder: Normal caliber           -Contents:  No cholelithiasis           -Wall:  Normal           -Other:  No pericholecystic fluid. Right Kidney: No hydronephrosis.  The right kidney measures about 9.9 cm. Ascites: None.    IMPRESSION: NORMAL SONOGRAPHIC APPEARANCE OF THE RIGHT UPPER QUADRANT. : NELLI   Transcribe Date/Time: Aug 16 2024 12:07P Dictated by : TOMI ALVARADO MD This examination  was interpreted and the report reviewed and electronically signed by: TOMI ALVARADO MD on Aug 16 2024 12:09PM  EST      Medications:  Scheduled medications  famotidine, 20 mg, intravenous, q12h GUTIERREZ      Continuous medications     PRN medications  PRN medications: ondansetron **OR** ondansetron    Assessment    Assessment & Plan   Ms. Bharti Echeverria is a 29 y.o. female who presents with 2 days of epigastric pain, nausea/vomiting and lack of oral intake. She has a PMHx of pancreatitis about 6 years ago. She reports that these symptoms feel similar to her prior experience with pancreatitis. Did have diarrhea earlier this week, and two of her kids also have a diarrheal illness. Hemodynamically stable while in the ED, and labs were largely unremarkable. Imaging negative for acute abdominal etiology. Patient did fail p.o. challenge in the ED, thus decision made for admission.    Principal Problem:    Nausea & vomiting     #Intractable nausea/vomiting  #Epigastric abdominal pain  #Mild intrahepatic biliary duct dilation  #History of pancreatitis  -CT imaging negative for any acute intra-abdominal etiology, no signs of pancreatitis alongside normal lipase ruling out pancreatitis, did show mild intrahepatic duct dilation, however is without RUQ pain, no leukocytosis or liver injury making less likely cholecystitis  -Suspect likely viral illness contributing as patient reports her children had GI like illness at home, denies any new medications or drug use  Plan:  -Supportive care  -Antiemetics as needed and famotidine  -Clear liquid diet advance as tolerated    Global Plan of Care  Diet: CLD  DVT ppx: Not indicated  Code Status: Full code  Dispo: Anticipate 1 midnight stay pending able to tolerate p.o. intake.  Anticipate discharge home      Patient seen and to be staffed with attending.  Signature: Latasha Guillory DO  Internal Medicine PGY-1 Resident  Date: August 18, 2024    Patient seen and examined independent of  resident.  My input was implemented above and agree with documentation    Ishan Jesus, DO  PGY3 IM

## 2024-08-18 NOTE — DISCHARGE INSTRUCTIONS
Please take Protonix once daily for 30 days. Please Stop taking the prilosec while taking Protonix. We prescribed a pepcid medication to be taken as needed twice daily for breakthrough heartburn or stomach pains.     Thank you for allowing us to participate in your healthcare,    [facility] [place]

## 2024-08-27 LAB
ATRIAL RATE: 89 BPM
P AXIS: 75 DEGREES
P OFFSET: 189 MS
P ONSET: 140 MS
PR INTERVAL: 158 MS
Q ONSET: 219 MS
QRS COUNT: 15 BEATS
QRS DURATION: 84 MS
QT INTERVAL: 392 MS
QTC CALCULATION(BAZETT): 476 MS
QTC FREDERICIA: 446 MS
R AXIS: 71 DEGREES
T AXIS: 63 DEGREES
T OFFSET: 415 MS
VENTRICULAR RATE: 89 BPM

## 2024-09-25 ENCOUNTER — APPOINTMENT (OUTPATIENT)
Dept: GASTROENTEROLOGY | Facility: CLINIC | Age: 29
End: 2024-09-25
Payer: COMMERCIAL